# Patient Record
Sex: FEMALE | Race: BLACK OR AFRICAN AMERICAN | URBAN - METROPOLITAN AREA
[De-identification: names, ages, dates, MRNs, and addresses within clinical notes are randomized per-mention and may not be internally consistent; named-entity substitution may affect disease eponyms.]

---

## 2023-11-08 ENCOUNTER — OFFICE VISIT (OUTPATIENT)
Dept: URBAN - METROPOLITAN AREA CLINIC 86 | Facility: CLINIC | Age: 68
End: 2023-11-08

## 2023-11-09 ENCOUNTER — OFFICE VISIT (OUTPATIENT)
Dept: URBAN - METROPOLITAN AREA CLINIC 86 | Facility: CLINIC | Age: 68
End: 2023-11-09
Payer: COMMERCIAL

## 2023-11-09 ENCOUNTER — LAB OUTSIDE AN ENCOUNTER (OUTPATIENT)
Dept: URBAN - METROPOLITAN AREA CLINIC 86 | Facility: CLINIC | Age: 68
End: 2023-11-09

## 2023-11-09 ENCOUNTER — LAB OUTSIDE AN ENCOUNTER (OUTPATIENT)
Dept: URBAN - METROPOLITAN AREA CLINIC 92 | Facility: CLINIC | Age: 68
End: 2023-11-09

## 2023-11-09 VITALS
HEART RATE: 82 BPM | HEIGHT: 66 IN | TEMPERATURE: 98 F | BODY MASS INDEX: 33.19 KG/M2 | WEIGHT: 206.5 LBS | SYSTOLIC BLOOD PRESSURE: 129 MMHG | DIASTOLIC BLOOD PRESSURE: 85 MMHG

## 2023-11-09 DIAGNOSIS — Z71.85 VACCINE COUNSELING: ICD-10-CM

## 2023-11-09 DIAGNOSIS — R74.8 ELEVATED LIVER ENZYMES: ICD-10-CM

## 2023-11-09 DIAGNOSIS — B19.20 HEPATITIS C VIRUS INFECTION WITHOUT HEPATIC COMA, UNSPECIFIED CHRONICITY: ICD-10-CM

## 2023-11-09 PROBLEM — 443913008: Status: ACTIVE | Noted: 2023-11-09

## 2023-11-09 PROBLEM — 50711007: Status: ACTIVE | Noted: 2023-11-09

## 2023-11-09 PROBLEM — 707724006: Status: ACTIVE | Noted: 2023-11-09

## 2023-11-09 PROCEDURE — 99214 OFFICE O/P EST MOD 30 MIN: CPT | Performed by: PHYSICIAN ASSISTANT

## 2023-11-09 RX ORDER — AMLODIPINE BESYLATE 10 MG/1
1 TABLET TABLET ORAL ONCE A DAY
Status: ACTIVE | COMMUNITY

## 2023-11-09 RX ORDER — MECLIZINE HYDROCHLORIDE 25 MG/1
1 TABLET AS NEEDED TABLET ORAL
Status: ACTIVE | COMMUNITY

## 2023-11-09 NOTE — HPI-TODAY'S VISIT:
This is a 68 year old female here for evaluation of hcv. Dr. Jeanine Wynn is her primary care provider. A copy of the note will be sent to the referring provider.   Discusssed hcv virus and not sure when had gotten  passed in 2015 but was involved in a lot . discussed hard to get this way but possible.  10/9/23 labs with total chol 228, hdl 46, na 140 k 4.3, tb 0.6, alp 105, ast 50, alt 45 hemoglobin 15, platelets 201,  Medications include: BP : amlodipine 10 mg, meclizine 25 mg oral tablet  She is not on any other medications  Discussed the tx and need to get the us and more labs and then can review and start treatment.

## 2023-11-14 ENCOUNTER — TELEPHONE ENCOUNTER (OUTPATIENT)
Dept: URBAN - METROPOLITAN AREA CLINIC 86 | Facility: CLINIC | Age: 68
End: 2023-11-14

## 2023-11-14 LAB
A/G RATIO: 1.3
ABSOLUTE BASOPHILS: 31
ABSOLUTE EOSINOPHILS: 92
ABSOLUTE LYMPHOCYTES: 2739
ABSOLUTE MONOCYTES: 482
ABSOLUTE NEUTROPHILS: 2757
ALBUMIN: 4.5
ALKALINE PHOSPHATASE: 90
ALT (SGPT): 42
AST (SGOT): 43
BASOPHILS: 0.5
BILIRUBIN, TOTAL: 0.6
BUN/CREATININE RATIO: (no result)
BUN: 13
CALCIUM: 9.7
CARBON DIOXIDE, TOTAL: 26
CHLORIDE: 103
CREATININE: 0.81
EGFR: 78
EOSINOPHILS: 1.5
GLOBULIN, TOTAL: 3.5
GLUCOSE: 95
HCV RNA, QUANTITATIVE REAL TIME PCR: (no result)
HCV RNA, QUANTITATIVE REAL TIME PCR: 6.61
HEMATOCRIT: 46.1
HEMOGLOBIN: 15.1
HEPATITIS A AB, TOTAL: REACTIVE
HEPATITIS B CORE AB TOTAL: REACTIVE
HEPATITIS B SURFACE AB IMMUNITY, QN: 102
HEPATITIS B SURFACE ANTIGEN: (no result)
HEPATITIS C VIRAL RNA: (no result)
LYMPHOCYTES: 44.9
MCH: 26.7
MCHC: 32.8
MCV: 81.6
MONOCYTES: 7.9
MPV: 10.9
NEUTROPHILS: 45.2
PLATELET COUNT: 199
POTASSIUM: 3.8
PROTEIN, TOTAL: 8
RDW: 12.7
RED BLOOD CELL COUNT: 5.65
SODIUM: 141
WHITE BLOOD CELL COUNT: 6.1

## 2023-11-14 NOTE — HPI-TODAY'S VISIT:
Dear Ruby Paige,   The 11/9/23 labs were sent to me. The hepatitis C viral genotype is 1a and this is one of the more common ones and responds to treatment.  The hepatitis B surface immunity was seen at 102.  The creatinine or kidney function 0.81 and this is normal.  Bilirubin 0.6, alkaline phosphatase 90, AST 43, ALT 42.  Goal for the AST and ALT is less than 25.  Once we start treatment we will be able to watch this improve.  Hemoglobin 15, MCV 81, platelets 199.  The hepatitis B core total is reactive but it does not appear there is any current infection.  You are immune to hepatitis A.  The hepatitis C viral load is [id number] and we will be able to watch this drop as we start treatment. We will see what the ultrasound shows and review at the follow up.  Christine Sotelo PA-C

## 2023-11-20 ENCOUNTER — OFFICE VISIT (OUTPATIENT)
Dept: URBAN - METROPOLITAN AREA CLINIC 114 | Facility: CLINIC | Age: 68
End: 2023-11-20
Payer: COMMERCIAL

## 2023-11-20 DIAGNOSIS — R16.1 SPLENOMEGALY: ICD-10-CM

## 2023-11-20 DIAGNOSIS — R74.8 ELEVATED LIVER ENZYMES: ICD-10-CM

## 2023-11-20 DIAGNOSIS — K76.0 FATTY LIVER: ICD-10-CM

## 2023-11-20 PROCEDURE — 93975 VASCULAR STUDY: CPT

## 2023-11-20 PROCEDURE — 76705 ECHO EXAM OF ABDOMEN: CPT

## 2023-11-27 ENCOUNTER — OFFICE VISIT (OUTPATIENT)
Dept: URBAN - METROPOLITAN AREA CLINIC 86 | Facility: CLINIC | Age: 68
End: 2023-11-27
Payer: COMMERCIAL

## 2023-11-27 ENCOUNTER — TELEPHONE ENCOUNTER (OUTPATIENT)
Dept: URBAN - METROPOLITAN AREA CLINIC 92 | Facility: CLINIC | Age: 68
End: 2023-11-27

## 2023-11-27 VITALS
HEIGHT: 66 IN | WEIGHT: 204 LBS | HEART RATE: 82 BPM | SYSTOLIC BLOOD PRESSURE: 121 MMHG | DIASTOLIC BLOOD PRESSURE: 77 MMHG | TEMPERATURE: 97 F | BODY MASS INDEX: 32.78 KG/M2

## 2023-11-27 DIAGNOSIS — Z71.85 VACCINE COUNSELING: ICD-10-CM

## 2023-11-27 DIAGNOSIS — B19.20 HEPATITIS C VIRUS INFECTION WITHOUT HEPATIC COMA, UNSPECIFIED CHRONICITY: ICD-10-CM

## 2023-11-27 DIAGNOSIS — R74.8 ELEVATED LIVER ENZYMES: ICD-10-CM

## 2023-11-27 DIAGNOSIS — Z79.899 HIGH RISK MEDICATION USE: ICD-10-CM

## 2023-11-27 PROCEDURE — 99214 OFFICE O/P EST MOD 30 MIN: CPT | Performed by: PHYSICIAN ASSISTANT

## 2023-11-27 RX ORDER — MECLIZINE HYDROCHLORIDE 25 MG/1
1 TABLET AS NEEDED TABLET ORAL
Status: ACTIVE | COMMUNITY

## 2023-11-27 RX ORDER — VELPATASVIR AND SOFOSBUVIR 100; 400 MG/1; MG/1
1 TABLET TABLET, FILM COATED ORAL ONCE A DAY
Qty: 84 TABLET | Refills: 0 | OUTPATIENT
Start: 2023-11-27 | End: 2024-02-19

## 2023-11-27 RX ORDER — AMLODIPINE BESYLATE 10 MG/1
1 TABLET TABLET ORAL ONCE A DAY
Status: ACTIVE | COMMUNITY

## 2023-11-27 NOTE — HPI-TODAY'S VISIT:
This is a 68 year old female here for evaluation of hcv. Dr. Jeanine Wynn is her primary care provider. A copy of the note will be sent to the referring provider.   11/27/23 ov 11/20/23 us showing fatty liver. no lesions. gb normal. cbd 3 mm, normal. right kidney normal. spleen 13 cm. hepatic vasc patent   The 11/9/23 labs were sent to me. The hepatitis C viral genotype is 1a and this is one of the more common ones and responds to treatment. The hepatitis B surface immunity was seen at 102. The creatinine or kidney function 0.81 and this is normal. Bilirubin 0.6, alkaline phosphatase 90, AST 43, ALT 42. Goal for the AST and ALT is less than 25. Once we start treatment we will be able to watch this improve. Hemoglobin 15, MCV 81, platelets 199. The hepatitis B core total is reactive but it does not appear there is any current infection. You are immune to hepatitis A. The hepatitis C viral load is [id number] and we will be able to watch this drop as we start treatment. We will see what the ultrasound shows and review at the follow up.   Discussed the us and fatty liver and work on this and tx will help  will start epclusa and discussed   recap Discusssed hcv virus and not sure when had gotten  passed in 2015 but was involved in a lot . discussed hard to get this way but possible.  10/9/23 labs with total chol 228, hdl 46, na 140 k 4.3, tb 0.6, alp 105, ast 50, alt 45 hemoglobin 15, platelets 201,  Medications include: BP : amlodipine 10 mg, meclizine 25 mg oral tablet  She is not on any other medications  Discussed the tx and need to get the us and more labs and then can review and start treatment. quit on own

## 2023-11-30 ENCOUNTER — TELEPHONE ENCOUNTER (OUTPATIENT)
Dept: URBAN - METROPOLITAN AREA CLINIC 92 | Facility: CLINIC | Age: 68
End: 2023-11-30

## 2023-12-04 ENCOUNTER — TELEPHONE ENCOUNTER (OUTPATIENT)
Dept: URBAN - METROPOLITAN AREA CLINIC 86 | Facility: CLINIC | Age: 68
End: 2023-12-04

## 2023-12-25 ENCOUNTER — LAB OUTSIDE AN ENCOUNTER (OUTPATIENT)
Dept: URBAN - METROPOLITAN AREA CLINIC 86 | Facility: CLINIC | Age: 68
End: 2023-12-25

## 2024-01-04 ENCOUNTER — TELEPHONE ENCOUNTER (OUTPATIENT)
Dept: URBAN - METROPOLITAN AREA CLINIC 86 | Facility: CLINIC | Age: 69
End: 2024-01-04

## 2024-01-08 ENCOUNTER — LAB OUTSIDE AN ENCOUNTER (OUTPATIENT)
Dept: URBAN - METROPOLITAN AREA CLINIC 86 | Facility: CLINIC | Age: 69
End: 2024-01-08

## 2024-01-12 ENCOUNTER — TELEPHONE ENCOUNTER (OUTPATIENT)
Dept: URBAN - METROPOLITAN AREA CLINIC 86 | Facility: CLINIC | Age: 69
End: 2024-01-12

## 2024-01-12 ENCOUNTER — DASHBOARD ENCOUNTERS (OUTPATIENT)
Age: 69
End: 2024-01-12

## 2024-01-12 ENCOUNTER — OFFICE VISIT (OUTPATIENT)
Dept: URBAN - METROPOLITAN AREA TELEHEALTH 2 | Facility: TELEHEALTH | Age: 69
End: 2024-01-12
Payer: COMMERCIAL

## 2024-01-12 DIAGNOSIS — R74.8 ELEVATED LIVER ENZYMES: ICD-10-CM

## 2024-01-12 DIAGNOSIS — Z79.899 HIGH RISK MEDICATION USE: ICD-10-CM

## 2024-01-12 DIAGNOSIS — Z71.85 VACCINE COUNSELING: ICD-10-CM

## 2024-01-12 DIAGNOSIS — B19.20 HEPATITIS C VIRUS INFECTION WITHOUT HEPATIC COMA, UNSPECIFIED CHRONICITY: ICD-10-CM

## 2024-01-12 PROCEDURE — 99214 OFFICE O/P EST MOD 30 MIN: CPT | Performed by: PHYSICIAN ASSISTANT

## 2024-01-12 RX ORDER — MECLIZINE HYDROCHLORIDE 25 MG/1
1 TABLET AS NEEDED TABLET ORAL
Status: ACTIVE | COMMUNITY

## 2024-01-12 RX ORDER — AMLODIPINE BESYLATE 10 MG/1
1 TABLET TABLET ORAL ONCE A DAY
Status: ACTIVE | COMMUNITY

## 2024-01-12 RX ORDER — VELPATASVIR AND SOFOSBUVIR 100; 400 MG/1; MG/1
1 TABLET TABLET, FILM COATED ORAL ONCE A DAY
Qty: 84 TABLET | Refills: 0 | Status: ACTIVE | COMMUNITY
Start: 2023-11-27 | End: 2024-02-19

## 2024-01-12 NOTE — HPI-TODAY'S VISIT:
This is a 68 year old female here for evaluation of hcv. Dr. Jeanine Wynn is her primary care provider. A copy of the note will be sent to the referring provider.    1/12/24 telemed  She is taking the epclusa  she is doing well she has ont done labs  she denies side affect to the medications  she has not missed any doses  11/27/23 ov 11/20/23 us showing fatty liver. no lesions. gb normal. cbd 3 mm, normal. right kidney normal. spleen 13 cm. hepatic vasc patent   The 11/9/23 labs were sent to me. The hepatitis C viral genotype is 1a and this is one of the more common ones and responds to treatment. The hepatitis B surface immunity was seen at 102. The creatinine or kidney function 0.81 and this is normal. Bilirubin 0.6, alkaline phosphatase 90, AST 43, ALT 42. Goal for the AST and ALT is less than 25. Once we start treatment we will be able to watch this improve. Hemoglobin 15, MCV 81, platelets 199. The hepatitis B core total is reactive but it does not appear there is any current infection. You are immune to hepatitis A. The hepatitis C viral load is [id number] and we will be able to watch this drop as we start treatment. We will see what the ultrasound shows and review at the follow up.   Discussed the us and fatty liver and work on this and tx will help  will start epclusa and discussed   recap Discusssed hcv virus and not sure when had gotten  passed in 2015 but was involved in a lot . discussed hard to get this way but possible.  10/9/23 labs with total chol 228, hdl 46, na 140 k 4.3, tb 0.6, alp 105, ast 50, alt 45 hemoglobin 15, platelets 201,  Medications include: BP : amlodipine 10 mg, meclizine 25 mg oral tablet  She is not on any other medications  Discussed the tx and need to get the us and more labs and then can review and start treatment. Dear Ruby Paige,   The 11/9/23 labs were sent to me. The hepatitis C viral genotype is 1a and this is one of the more common ones and responds to treatment.  The hepatitis B surface immunity was seen at 102.  The creatinine or kidney function 0.81 and this is normal.  Bilirubin 0.6, alkaline phosphatase 90, AST 43, ALT 42.  Goal for the AST and ALT is less than 25.  Once we start treatment we will be able to watch this improve.  Hemoglobin 15, MCV 81, platelets 199.  The hepatitis B core total is reactive but it does not appear there is any current infection.  You are immune to hepatitis A.  The hepatitis C viral load is [id number] and we will be able to watch this drop as we start treatment. We will see what the ultrasound shows and review at the follow up.  Christine Sotelo PA-C This is a 68 year old female here for evaluation of hcv. Dr. Jeanine Wynn is her primary care provider. A copy of the note will be sent to the referring provider.   Discusssed hcv virus and not sure when had gotten  passed in 2015 but was involved in a lot . discussed hard to get this way but possible.  10/9/23 labs with total chol 228, hdl 46, na 140 k 4.3, tb 0.6, alp 105, ast 50, alt 45 hemoglobin 15, platelets 201,  Medications include: BP : amlodipine 10 mg, meclizine 25 mg oral tablet  She is not on any other medications  Discussed the tx and need to get the us and more labs and then can review and start treatment.

## 2024-02-12 ENCOUNTER — LAB (OUTPATIENT)
Dept: URBAN - METROPOLITAN AREA TELEHEALTH 2 | Facility: TELEHEALTH | Age: 69
End: 2024-02-12